# Patient Record
Sex: FEMALE | Race: OTHER | Employment: FULL TIME | URBAN - METROPOLITAN AREA
[De-identification: names, ages, dates, MRNs, and addresses within clinical notes are randomized per-mention and may not be internally consistent; named-entity substitution may affect disease eponyms.]

---

## 2018-11-27 ENCOUNTER — APPOINTMENT (OUTPATIENT)
Dept: RADIOLOGY | Facility: CLINIC | Age: 63
End: 2018-11-27
Payer: OTHER MISCELLANEOUS

## 2018-11-27 ENCOUNTER — OFFICE VISIT (OUTPATIENT)
Dept: OBGYN CLINIC | Facility: CLINIC | Age: 63
End: 2018-11-27
Payer: OTHER MISCELLANEOUS

## 2018-11-27 VITALS
BODY MASS INDEX: 33.13 KG/M2 | WEIGHT: 180 LBS | HEIGHT: 62 IN | DIASTOLIC BLOOD PRESSURE: 96 MMHG | SYSTOLIC BLOOD PRESSURE: 153 MMHG | HEART RATE: 86 BPM

## 2018-11-27 DIAGNOSIS — M25.561 RIGHT KNEE PAIN, UNSPECIFIED CHRONICITY: Primary | ICD-10-CM

## 2018-11-27 DIAGNOSIS — M17.11 PRIMARY OSTEOARTHRITIS OF RIGHT KNEE: ICD-10-CM

## 2018-11-27 DIAGNOSIS — M25.561 RIGHT KNEE PAIN, UNSPECIFIED CHRONICITY: ICD-10-CM

## 2018-11-27 DIAGNOSIS — S83.241A OTHER TEAR OF MEDIAL MENISCUS, CURRENT INJURY, RIGHT KNEE, INITIAL ENCOUNTER: ICD-10-CM

## 2018-11-27 PROCEDURE — 73562 X-RAY EXAM OF KNEE 3: CPT

## 2018-11-27 PROCEDURE — 99203 OFFICE O/P NEW LOW 30 MIN: CPT | Performed by: ORTHOPAEDIC SURGERY

## 2018-11-27 RX ORDER — METHIMAZOLE 10 MG/1
TABLET ORAL
COMMUNITY
Start: 2018-10-15

## 2018-11-27 RX ORDER — LOSARTAN POTASSIUM AND HYDROCHLOROTHIAZIDE 25; 100 MG/1; MG/1
TABLET ORAL
COMMUNITY
End: 2019-01-25

## 2018-11-27 RX ORDER — BISACODYL 5 MG/1
TABLET, COATED ORAL
COMMUNITY
Start: 2016-11-07

## 2018-11-27 RX ORDER — OMEPRAZOLE 40 MG/1
CAPSULE, DELAYED RELEASE ORAL
COMMUNITY
Start: 2018-09-18

## 2018-11-27 NOTE — PROGRESS NOTES
HPI:  Patient is a 66y o  year old RHD female  who presents with chief complaint of Knee Pain  Patient presents with a knee injury and pain involving the right knee  Onset of the symptoms was 7/7/2018  Inciting event: Patient was working near a conveyor belt and a box fell off and hit the patient on the right knee    Current symptoms include Pain and swelling over the patella and patella tendon and to a lesser extent medially    Pain is aggravated by Knee extension  Patient has had no prior knee problems  Evaluation to date: MRI: We performed at [facility] on July 13, 2018  Treatment to date: PT which was not very effective patient has been seen an orthopedic surgeon at [facility]  He prescribed physical therapy and patient has been through extensive physical therapy  He offered a steroid injection for the right knee and patient did not want that treatment  Recently he recommended right knee arthroscopy      ROS:   General: No fever, no chills, no weight loss, no weight gain  HEENT:  No loss of hearing, no nose bleeds, no sore throat  Eyes:  No eye pain, no red eyes, no visual disturbance  Respiratory:  No cough, no shortness of breath, no wheezing  Cardiovascular:  No chest pain, no palpitations, no edema  GI: No abdominal pain, no nausea, no vomiting  Endocrine: No frequent urination, no excessive thirst  Urinary:  No dysuria, no hematuria, no incontinence  Musculoskeletal: see HPI and PE  Skin:  No rash, no wounds  Neurological:  No dizziness, no headache, no numbness  Psychiatric:  No difficulty concentrating, no depression, no suicide thoughts, no anxiety  Review of all other systems is negative    PMH:  Past Medical History:   Diagnosis Date    Hypertension     Thyroid disease        PSH:  Past Surgical History:   Procedure Laterality Date    ELBOW SURGERY Left 1985       Medications:  Current Outpatient Prescriptions   Medication Sig Dispense Refill    CALCIUM PO Take by mouth      losartan-hydrochlorothiazide (HYZAAR) 100-25 MG per tablet losartan 100 mg-hydrochlorothiazide 25 mg tablet      methimazole (TAPAZOLE) 10 mg tablet 1/2 TABLET DAILY BY MOUTH   Multiple Vitamins-Minerals (PX COMPLETE SENIOR MULTIVITS) TABS take 1 tablet by oral route  every day      omeprazole (PriLOSEC) 40 MG capsule        No current facility-administered medications for this visit  Allergies:  No Known Allergies    Family History:  Family History   Problem Relation Age of Onset    No Known Problems Mother     Hypertension Father        Social History:  Social History     Occupational History    Not on file  Social History Main Topics    Smoking status: Never Smoker    Smokeless tobacco: Never Used    Alcohol use No    Drug use: No    Sexual activity: Not on file       Physical Exam:  General :  Alert, cooperative, no distress, appears stated age  Blood pressure 153/96, pulse 86, height 5' 2" (1 575 m), weight 81 6 kg (180 lb)  Head:  Normocephalic, without obvious abnormality, atraumatic   Eyes:  Conjunctiva/corneas clear, EOM's intact,   Ears: Both ears normal appearance, no hearing deficits  Nose: Nares normal, septum midline, no drainage    Neck: Supple,  trachea midline, no adenopathy, no tenderness, no mass   Back:   Symmetric, no curvature, ROM normal, no tenderness   Lungs:   Respirations unlabored   Chest Wall:  No tenderness or deformity   Extremities: Extremities normal, atraumatic, no cyanosis or edema      Pulses: 2+ and symmetric   Skin: Skin color, texture, turgor normal, no rashes or lesions      Neurologic: Normal           Right Knee Exam   Swelling: Mild    Tenderness   The patient is experiencing tenderness in the medial joint line, patella, patellar tendon      Range of Motion   Normal right knee ROM    Muscle Strength   Normal right knee strength    Tests   Lachman:  Anterior - Negative      Drawer:       Anterior - Negative Posterior - Negative  Varus:  Negative    Patellar Apprehension: No    Comments:  Apley grind test positive  Imaging Studies: The following imaging reports were reviewed in office today  MRI right knee by [facility] 07/13/2018: The impression on the report is tricompartmental osteoarthritis changes  Insertional quadriceps tendinosis and patella tendon degeneration and anterior subcutaneous edema  Bowed MCL  Possible partial ACL tear  PCL intact  Lateral meniscus intact  Fibular collateral ligament and iliotibial band intact  Medial meniscus posterior tibial attachment torn  I do not know if that means a root tear  Assessment  Encounter Diagnoses   Name Primary?  Right knee pain, unspecified chronicity Yes    Primary osteoarthritis of right knee     Other tear of medial meniscus, current injury, right knee, initial encounter          Plan: On physical exam patient's primary problem seems to be the patella tendinitis  I discussed with patient that arthroscopic surgery would not benefit that  I recommended a trial of iontophoresis  Patient has arthritis that is most significant in the medial compartments and might benefit for a short period of time from an arthroscopic procedure with chondroplasty but most likely that would be of limited benefit make the difference in the long run  Therefore I would not recommend arthroscopic surgery for that  The nature of patient's medial meniscus tear is not clear to me  I do not have the MRI films to review  If this is a posterior root tear that would certainly be worth performing arthroscopy to see if that could be repaired  If this is a degenerative meniscal tear there would be limited to no benefit from an arthroscopic procedure unless she has catching and locking which I did not appreciate in office today    Patient I believe is currently going to physical therapy and she can possibly add iontophoresis to her regimen  She is going to obtain the MRI images and bring them here for review  We will see her back in 1 week

## 2018-12-11 ENCOUNTER — OFFICE VISIT (OUTPATIENT)
Dept: OBGYN CLINIC | Facility: CLINIC | Age: 63
End: 2018-12-11
Payer: OTHER MISCELLANEOUS

## 2018-12-11 VITALS
BODY MASS INDEX: 32.76 KG/M2 | HEART RATE: 97 BPM | WEIGHT: 178 LBS | SYSTOLIC BLOOD PRESSURE: 170 MMHG | DIASTOLIC BLOOD PRESSURE: 96 MMHG | HEIGHT: 62 IN

## 2018-12-11 DIAGNOSIS — M25.561 RIGHT KNEE PAIN, UNSPECIFIED CHRONICITY: ICD-10-CM

## 2018-12-11 DIAGNOSIS — M76.51 PATELLAR TENDINITIS OF RIGHT KNEE: ICD-10-CM

## 2018-12-11 DIAGNOSIS — M17.11 PRIMARY OSTEOARTHRITIS OF RIGHT KNEE: Primary | ICD-10-CM

## 2018-12-11 DIAGNOSIS — S83.242A OTHER TEAR OF MEDIAL MENISCUS OF LEFT KNEE AS CURRENT INJURY, INITIAL ENCOUNTER: ICD-10-CM

## 2018-12-11 PROCEDURE — 99214 OFFICE O/P EST MOD 30 MIN: CPT | Performed by: ORTHOPAEDIC SURGERY

## 2018-12-11 RX ORDER — NAPROXEN 500 MG/1
500 TABLET ORAL 2 TIMES DAILY WITH MEALS
Qty: 45 TABLET | Refills: 1 | Status: SHIPPED | OUTPATIENT
Start: 2018-12-11 | End: 2019-01-25 | Stop reason: SDUPTHER

## 2018-12-11 NOTE — PROGRESS NOTES
Chief Complaint   Patient presents with    Right Knee - Follow-up         Subjective   Patient here for follow-up  Patient here with her  and worker's compensation   Original  injury was on July 7, 2018  Box fell off conveyor belt and hit right knee  Patient was treated at [facility] and offered injections and surgery which patient did not  Patient was then discharged and came here for 2nd opinion  Patient did have MRI which was positive for osteoarthritis changes with a possible posterior root meniscus tear  Patient has done physical therapy in the past   Patient is currently working light duty  Patient states most of her pain is over the anterior aspect of the knee  She also has pain in the medial posterior aspect the knee  Denies any radiating pain in the leg  Denies any numbness or tingling  When she extends her leg fully she has severe pain over the anterior patella tendon  The medial posterior pain occurs if she walks too much  ROS:   General: no fever, no chills  Respiratory:  Positive for cough, shortness of breath or wheezing  Cardiovascular:  No chest pain, no palpitations  Musculoskeletal: see HPI and PE  SKIN:  No skin rash, no dry skin  Neurological:  No headaches, no confusion  Psychiatric:  No suicide thoughts, no anxiety, no depression  Review of all other systems is negative    Past Medical History:   Diagnosis Date    Hypertension     Thyroid disease        Current Outpatient Prescriptions on File Prior to Visit   Medication Sig Dispense Refill    CALCIUM PO Take by mouth      losartan-hydrochlorothiazide (HYZAAR) 100-25 MG per tablet losartan 100 mg-hydrochlorothiazide 25 mg tablet      methimazole (TAPAZOLE) 10 mg tablet 1/2 TABLET DAILY BY MOUTH        Multiple Vitamins-Minerals (PX COMPLETE SENIOR MULTIVITS) TABS take 1 tablet by oral route  every day      omeprazole (PriLOSEC) 40 MG capsule        No current facility-administered medications on file prior to visit  No Known Allergies      Physical Exam:    Vitals:    12/11/18 1437   BP: 170/96   Pulse: 97   Weight: 80 7 kg (178 lb)   Height: 5' 2" (1 575 m)       General Appearance:  Alert, cooperative, no distress, appears stated age   Lungs:   respirations unlabored   Heart:  Normal heart rate noted   Abdomen:   Soft, non-tender,  no masses   Extremities: Extremities normal, atraumatic, no cyanosis or edema   Pulses: 2+ and symmetric   Skin: Skin color, texture, turgor normal, no rashes or lesions   Neurologic: Normal         Ortho Exam  Examination of the right knee shows skin intact with no erythema noted  Range of motion is 0-120 degrees with mild effusion  There is tenderness with palpation over the medial posterior aspect of the knee  Tenderness with palpation over the patella tendon at its proximal aspect  Pain over the patella tendon with active full extension  Sensation is intact to light touch L1-S1 distributions  Negative anterior drawer, negative posterior drawer  Knee stable to varus and valgus stretch test  Apply grind equivocal    MRI right knee by [facility] 07/13/2018: The impression on the report is tricompartmental osteoarthritis changes  Insertional quadriceps tendinosis and patella tendon degeneration and anterior subcutaneous edema  Bowed MCL  Possible partial ACL tear  PCL intact  Lateral meniscus intact  Fibular collateral ligament and iliotibial band intact  Medial meniscus posterior tibial attachment torn  My review of MRI is suspicious for medial meniscus posterior root tear  Howevere remainder of meniscus is degenerative so I do not think a repair would be successful  Also patient has significant chondral changes in medial compartment already  She also has patella tendonitis      ASSESSMENT:    Brianna Jacinto was seen today for follow-up      Diagnoses and all orders for this visit:    Right knee pain, unspecified chronicity  - naproxen (NAPROSYN) 500 mg tablet; Take 1 tablet (500 mg total) by mouth 2 (two) times a day with meals  -     Ambulatory referral to Physical Therapy; Future  -     Ambulatory referral to Physical Therapy; Future          PLAN:  Patient with right knee internal derangement  Patient has clinical symptoms of patellar tendinitis but may also have meniscus tear  Surgical /nonsurgical options were discussed with the patient  Nonsurgical options would be to try physical therapy using iontophoresis  Surgical options would be to attempt a right knee arthroscopy and possible repair of the posterior root medial meniscus if it is torn  Patient understands that because of her advanced age and the possibility that it may not be torn or not repairable she may go through an arthroscopy and still have pain  She also may still have pain because of the other degenerative conditions in the knee  Also surgery if somehow successful would require participation in significant rehab and prolonged non weight bearing  Patient probably also is not a good post op rehab candidate  After discussion of risks and benefits it was decided to treat this non operatively at this time  Prescription for physical therapy with iontophoresis was given to the patient as well as Naprosyn for patient to try as needed  Will follow up in six weeks

## 2018-12-12 ENCOUNTER — TELEPHONE (OUTPATIENT)
Dept: PAIN MEDICINE | Facility: MEDICAL CENTER | Age: 63
End: 2018-12-12

## 2018-12-12 NOTE — TELEPHONE ENCOUNTER
Caller: Sid Partida (patient son)  Dr Min Umana [phone]      Patient son is calling requesting a call from Dr Duran Becerra to go over yesterdays visit with him so he can better relate it to the patient  Please advise

## 2018-12-31 ENCOUNTER — TELEPHONE (OUTPATIENT)
Dept: OBGYN CLINIC | Facility: HOSPITAL | Age: 63
End: 2018-12-31

## 2018-12-31 DIAGNOSIS — M25.561 RIGHT KNEE PAIN, UNSPECIFIED CHRONICITY: Primary | ICD-10-CM

## 2018-12-31 NOTE — PROGRESS NOTES
Patient was requesting a new physical therapy script  One was placed into the chart  She may pick this up at her convenience

## 2018-12-31 NOTE — TELEPHONE ENCOUNTER
Please advise the patient new physical therapy script was placed into her chart  She may pick this up at her convenience  The patient did not provide a fax number to send over  We could try to call them again to see if they have a fax number at this time  Thank you

## 2018-12-31 NOTE — TELEPHONE ENCOUNTER
Dr Meme Villa    Patients  called requesting a new order for PT  I do apologize and I was having a very hard time hearing him and understanding him with the noise in the phone room this morning  I tried to get the number to where to fax it but he stated he didn't have it and that it should be in patients chart  I could not find anything with PT information

## 2019-01-25 ENCOUNTER — OFFICE VISIT (OUTPATIENT)
Dept: OBGYN CLINIC | Facility: CLINIC | Age: 64
End: 2019-01-25
Payer: OTHER MISCELLANEOUS

## 2019-01-25 VITALS
DIASTOLIC BLOOD PRESSURE: 90 MMHG | HEIGHT: 62 IN | SYSTOLIC BLOOD PRESSURE: 144 MMHG | HEART RATE: 66 BPM | BODY MASS INDEX: 33.49 KG/M2 | WEIGHT: 182 LBS

## 2019-01-25 DIAGNOSIS — M25.561 RIGHT KNEE PAIN, UNSPECIFIED CHRONICITY: ICD-10-CM

## 2019-01-25 DIAGNOSIS — S83.241A OTHER TEAR OF MEDIAL MENISCUS, CURRENT INJURY, RIGHT KNEE, INITIAL ENCOUNTER: Primary | ICD-10-CM

## 2019-01-25 DIAGNOSIS — M76.51 PATELLAR TENDINITIS OF RIGHT KNEE: ICD-10-CM

## 2019-01-25 DIAGNOSIS — M25.561 ACUTE PAIN OF RIGHT KNEE: ICD-10-CM

## 2019-01-25 PROCEDURE — 99213 OFFICE O/P EST LOW 20 MIN: CPT | Performed by: ORTHOPAEDIC SURGERY

## 2019-01-25 RX ORDER — NAPROXEN 500 MG/1
500 TABLET ORAL 2 TIMES DAILY WITH MEALS
Qty: 45 TABLET | Refills: 1 | Status: SHIPPED | OUTPATIENT
Start: 2019-01-25

## 2019-01-25 NOTE — PROGRESS NOTES
Chief Complaint   Patient presents with    Right Knee - Pain, Follow-up         Subjective  patient here with her  for follow-up right knee pain  Patient's original worker's Comp injury happened on July 7, 2018  Patient was initially treated at [facility] offering injections in surgery which patient deferred  Patient then came to Dr Paul Whipple for 2nd opinion and has been treating here since  Patient has been in physical therapy and tells me that seems to be helping  She still has pain when she fully extends her knee  Pain over the anterior aspect and anterior medial aspect of the knee  Denies any numbness or tingling lower extremity  Patient is currently working light duty mainly because of her shoulder  She still seeing the doctors at [facility] for her shoulder      ROS:   General: no fever, no chills  Respiratory:   shortness of breath or wheezing  Cardiovascular:  No chest pain, no palpitations  Musculoskeletal: see HPI and PE  SKIN:  No skin rash, no dry skin  Neurological:  No headaches, no confusion  Psychiatric:  No suicide thoughts, no anxiety, no depression  Review of all other systems is negative    Past Medical History:   Diagnosis Date    Hypertension     Thyroid disease        Current Outpatient Prescriptions on File Prior to Visit   Medication Sig Dispense Refill    CALCIUM PO Take by mouth      denosumab (PROLIA) 60 mg/mL Inject 60 mg under the skin once      methimazole (TAPAZOLE) 10 mg tablet 1/2 TABLET DAILY BY MOUTH        Multiple Vitamins-Minerals (PX COMPLETE SENIOR MULTIVITS) TABS take 1 tablet by oral route  every day      omeprazole (PriLOSEC) 40 MG capsule       [DISCONTINUED] naproxen (NAPROSYN) 500 mg tablet Take 1 tablet (500 mg total) by mouth 2 (two) times a day with meals 45 tablet 1    [DISCONTINUED] losartan-hydrochlorothiazide (HYZAAR) 100-25 MG per tablet losartan 100 mg-hydrochlorothiazide 25 mg tablet       No current facility-administered medications on file prior to visit  No Known Allergies      Physical Exam:    Vitals:    01/25/19 1058 01/25/19 1108   BP: 157/91 144/90   Pulse: 66 66   Weight: 82 6 kg (182 lb)    Height: 5' 2" (1 575 m)        General Appearance:  Alert, cooperative, no distress, appears stated age   Lungs:   respirations unlabored   Heart:  Normal heart rate noted   Abdomen:   Soft, non-tender,  no masses   Extremities: Extremities normal, atraumatic, no cyanosis or edema   Pulses: 2+ and symmetric   Skin: Skin color, texture, turgor normal, no rashes or lesions   Neurologic: Normal         Ortho Exam right knee examination shows skin intact with no erythema noted  Active full pain-free range of motion with mild effusion noted  Tenderness with palpation over the anterior medial aspect of the knee as well as the patella tendon  No tenderness noted with palpation over the lateral medial joint lines  Sensation intact light touch L1-S1 distributions  Knee stable to varus-valgus stress  Apply grind equivocal    MRI right knee by [facility] 07/13/2018: The impression on the report is tricompartmental osteoarthritis changes  Insertional quadriceps tendinosis and patella tendon degeneration and anterior subcutaneous edema  Bowed MCL  Possible partial ACL tear  PCL intact  Lateral meniscus intact  Fibular collateral ligament and iliotibial band intact  Medial meniscus posterior tibial attachment torn  My review of MRI is suspicious for medial meniscus posterior root tear  Howevere remainder of meniscus is degenerative so I do not think a repair would be successful  Also patient has significant chondral changes in medial compartment already  She also has patella tendonitis      ASSESSMENT:    Theo Light was seen today for pain and follow-up  Diagnoses and all orders for this visit:    Acute pain of right knee  -     Ambulatory referral to Physical Therapy;  Future    Right knee pain, unspecified chronicity  -     naproxen (NAPROSYN) 500 mg tablet; Take 1 tablet (500 mg total) by mouth 2 (two) times a day with meals          PLAN:  Patient with right knee internal derangement, MRI findings of osteoarthritis with possible medial meniscal root tear  Surgical nonsurgical options were discussed with the patient over at [facility] as well as with Dr Tobin Nobles  Patient opted for nonsurgical and would like to continue with physical therapy  Refill of Naprosyn was sent to the pharmacy  Patient will continue taking naproxen only as needed for pain and inflammation  Patient instructed to take that with food  She will continue with physical therapy and a new prescription will given to her today will follow up with us in four weeks for re-evaluation

## 2019-01-31 ENCOUNTER — TELEPHONE (OUTPATIENT)
Dept: OBGYN CLINIC | Facility: OTHER | Age: 64
End: 2019-01-31

## 2019-01-31 NOTE — TELEPHONE ENCOUNTER
Wendy Glover  [phone]  Dr Orville Cervantes is requesting a call back she would like to know if you would like the patient to have any work restriction? Aleyda Fernandez She was released for full duty today for her shoulder  She will go back to work for 12 hrs on her feet  She knows that she has asked this question but she needs to get clarification the patent is very upset

## 2019-02-04 NOTE — TELEPHONE ENCOUNTER
Please call Federica Brandon, let her know patient does have some restrictions  I placed work note in the system  This is an old injury and probably will release her to return to work the next visit      [facility]

## 2019-02-05 NOTE — TELEPHONE ENCOUNTER
The need more specific restrictions  IE she may stand how many hours per 12 hour shift  How many hours per 12 hour shift is she to sit/walk etc?    Please add this to note as the employer needs these in order to let patient work

## 2019-02-05 NOTE — TELEPHONE ENCOUNTER
Please call,   New work note now in system  If they need anything else they will need to send over specific form to fill out       Thanks    Toby

## 2019-02-05 NOTE — TELEPHONE ENCOUNTER
Hello,    New work note in system, thanks    [facility] [employer] [facility] [employer] [facility]

## 2019-03-05 ENCOUNTER — OFFICE VISIT (OUTPATIENT)
Dept: OBGYN CLINIC | Facility: CLINIC | Age: 64
End: 2019-03-05
Payer: OTHER MISCELLANEOUS

## 2019-03-05 VITALS — HEIGHT: 62 IN | BODY MASS INDEX: 33.49 KG/M2 | WEIGHT: 182 LBS

## 2019-03-05 DIAGNOSIS — M76.51 PATELLAR TENDINITIS OF RIGHT KNEE: ICD-10-CM

## 2019-03-05 DIAGNOSIS — S83.241D ACUTE MEDIAL MENISCUS TEAR OF RIGHT KNEE, SUBSEQUENT ENCOUNTER: Primary | ICD-10-CM

## 2019-03-05 PROCEDURE — 99213 OFFICE O/P EST LOW 20 MIN: CPT | Performed by: PHYSICIAN ASSISTANT

## 2019-03-05 NOTE — PROGRESS NOTES
Chief Complaint   Patient presents with    Right Knee - Follow-up, Pain, Swelling         Subjective  patient here following up for her right knee pain  She enters room with her  as well as the worker's   The regional worker's Comp injury was on July 7, 2018 and she was initially treated at [facility] offered surgery and or injections which patient refused both  Patient came here for 2nd opinion has been treating her since  Patient has had several months of physical therapy with some improvement  She still thinks she is unable to return back to her full-time job as a   Patient does want to return to a sedentary position but  Still has pain when she fully extends her knee over the anterior aspect of her knee  At times she has pain over the anterior medial aspect  She denies any numbness or tingling lower extremity  She does tell me she thinks she is improving  ROS:   General: no fever, no chills, positive for weight gain  Respiratory:   shortness of breath or wheezing  Cardiovascular:  No chest pain, no palpitations  Musculoskeletal: see HPI and PE  SKIN:  No skin rash, no dry skin  Neurological:  No headaches, no confusion, positive for headaches  Psychiatric:  No suicide thoughts, no anxiety, no depression  Review of all other systems is negative    Past Medical History:   Diagnosis Date    Hypertension     Thyroid disease        Current Outpatient Medications on File Prior to Visit   Medication Sig Dispense Refill    CALCIUM PO Take by mouth      denosumab (PROLIA) 60 mg/mL Inject 60 mg under the skin once      methimazole (TAPAZOLE) 10 mg tablet 1/2 TABLET DAILY BY MOUTH        Multiple Vitamins-Minerals (PX COMPLETE SENIOR MULTIVITS) TABS take 1 tablet by oral route  every day      naproxen (NAPROSYN) 500 mg tablet Take 1 tablet (500 mg total) by mouth 2 (two) times a day with meals 45 tablet 1    omeprazole (PriLOSEC) 40 MG capsule        No current facility-administered medications on file prior to visit  No Known Allergies      Physical Exam:    Vitals:    03/05/19 1104   Weight: 82 6 kg (182 lb)   Height: 5' 2" (1 575 m)       General Appearance:  Alert, cooperative, no distress, appears stated age   Lungs:   respirations unlabored   Heart:  Normal heart rate noted   Abdomen:   Soft, non-tender,  no masses   Extremities: Extremities normal, atraumatic, no cyanosis or edema   Pulses: 2+ and symmetric   Skin: Skin color, texture, turgor normal, no rashes or lesions   Neurologic: Normal         Ortho Exam   Examination of the right knee shows skin intact with no erythema noted  Active range of motion is from 0-115 degrees with no effusion  There is pain with full extension  Mild tenderness with palpation over the anterior medial aspect of the knee  Sensation is intact light touch L1-S1 distributions  5/5 strength with knee flexion and extension  Knee is stable to varus and valgus stress    Imaging    MRI right knee by [facility] 07/13/2018: The impression on the report is tricompartmental osteoarthritis changes  Insertional quadriceps tendinosis and patella tendon degeneration and anterior subcutaneous edema  Bowed MCL  Possible partial ACL tear  PCL intact  Lateral meniscus intact  Fibular collateral ligament and iliotibial band intact  Medial meniscus posterior tibial attachment torn  My review of MRI is suspicious for medial meniscus posterior root tear  Howevere remainder of meniscus is degenerative so I do not think a repair would be successful  Also patient has significant chondral changes in medial compartment already  She also has patella tendonitis      ASSESSMENT:    Laurie Luciano was seen today for follow-up, pain and swelling      Diagnoses and all orders for this visit:    Acute medial meniscus tear of right knee, subsequent encounter  -     Ambulatory referral to Physical Therapy; Future  -     Ambulatory Referral to Functional Capacity Evaluation; Future          PLAN:   Patient with outside MRI which was suspicious for a medial meniscus posterior root tear with the remainder of the meniscus being degenerative  Repair would most likely not be successful  Patient could try knee arthroscopy to help clean out in may give her some improvement in pain  Patient also might benefit from either cortisone or hyaluronic acid injections  At this point patient does not want any treatment  She does not want surgical intervention  She also denies any kind of injections at this point  Patient has had over six months of physical therapy she will have a couple more sessions and they will transfer her to a home exercise program where she can work on therapy at home  She will be returning to work at a sedentary position with no lifting more than 15 lb at this point  We will also send her for functional capacity exam as she does not want any more treatment  She will continue with the prox and as needed for pain    Will follow up with us after the functional capacity exam to determine 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

## 2019-03-05 NOTE — LETTER
March 5, 2019     Patient: Caitlyn Vasquez   YOB: 1955   Date of Visit: 3/5/2019       To Whom it May Concern:    Caitlyn Vasquez is under my professional care  She was seen in my office on 3/5/2019  She may return to work sedentary position with no lifting more than 15 lb  If you have any questions or concerns, please don't hesitate to call           Sincerely,          [employer]        CC: No Recipients

## 2019-04-17 ENCOUNTER — OFFICE VISIT (OUTPATIENT)
Dept: OBGYN CLINIC | Facility: CLINIC | Age: 64
End: 2019-04-17
Payer: OTHER MISCELLANEOUS

## 2019-04-17 VITALS
RESPIRATION RATE: 20 BRPM | WEIGHT: 184.4 LBS | HEART RATE: 71 BPM | DIASTOLIC BLOOD PRESSURE: 86 MMHG | SYSTOLIC BLOOD PRESSURE: 141 MMHG | HEIGHT: 62 IN | BODY MASS INDEX: 33.93 KG/M2

## 2019-04-17 DIAGNOSIS — M17.11 PRIMARY OSTEOARTHRITIS OF RIGHT KNEE: ICD-10-CM

## 2019-04-17 DIAGNOSIS — S83.241D ACUTE MEDIAL MENISCUS TEAR OF RIGHT KNEE, SUBSEQUENT ENCOUNTER: Primary | ICD-10-CM

## 2019-04-17 PROCEDURE — 99213 OFFICE O/P EST LOW 20 MIN: CPT | Performed by: PHYSICIAN ASSISTANT
